# Patient Record
Sex: MALE | Race: WHITE | NOT HISPANIC OR LATINO | Employment: UNEMPLOYED | URBAN - METROPOLITAN AREA
[De-identification: names, ages, dates, MRNs, and addresses within clinical notes are randomized per-mention and may not be internally consistent; named-entity substitution may affect disease eponyms.]

---

## 2023-03-22 ENCOUNTER — TELEPHONE (OUTPATIENT)
Dept: PREADMISSION TESTING | Facility: HOSPITAL | Age: 48
End: 2023-03-22
Payer: COMMERCIAL

## 2023-03-22 NOTE — TELEPHONE ENCOUNTER
----- Message from Nenita Tom sent at 3/22/2023 12:32 PM CDT -----  Regarding: cOLONOSCOPY  Contact: [phone]  Patient is calling in ref to scheduling a colonoscopy. Please call patient at   [phone]     Thank You

## 2023-03-23 DIAGNOSIS — Z12.11 COLON CANCER SCREENING: Primary | ICD-10-CM

## 2023-04-14 ENCOUNTER — ANESTHESIA EVENT (OUTPATIENT)
Dept: ENDOSCOPY | Facility: HOSPITAL | Age: 48
End: 2023-04-14
Payer: COMMERCIAL

## 2023-04-14 ENCOUNTER — ANESTHESIA (OUTPATIENT)
Dept: ENDOSCOPY | Facility: HOSPITAL | Age: 48
End: 2023-04-14
Payer: COMMERCIAL

## 2023-04-14 ENCOUNTER — HOSPITAL ENCOUNTER (OUTPATIENT)
Facility: HOSPITAL | Age: 48
Discharge: HOME | End: 2023-04-14
Attending: COLON & RECTAL SURGERY | Admitting: COLON & RECTAL SURGERY
Payer: COMMERCIAL

## 2023-04-14 DIAGNOSIS — Z12.11 SCREENING FOR COLON CANCER: ICD-10-CM

## 2023-04-14 PROCEDURE — 37000008 HC ANESTHESIA 1ST 15 MINUTES: Performed by: COLON & RECTAL SURGERY

## 2023-04-14 PROCEDURE — 88305 TISSUE EXAM BY PATHOLOGIST: CPT | Mod: 26,,, | Performed by: PATHOLOGY

## 2023-04-14 PROCEDURE — 45380 COLONOSCOPY AND BIOPSY: CPT | Mod: 33,59,, | Performed by: COLON & RECTAL SURGERY

## 2023-04-14 PROCEDURE — 45385 PR COLONOSCOPY,REMV LESN,SNARE: ICD-10-PCS | Mod: 33,,, | Performed by: COLON & RECTAL SURGERY

## 2023-04-14 PROCEDURE — 37000009 HC ANESTHESIA EA ADD 15 MINS: Performed by: COLON & RECTAL SURGERY

## 2023-04-14 PROCEDURE — 88305 TISSUE EXAM BY PATHOLOGIST: CPT | Performed by: PATHOLOGY

## 2023-04-14 PROCEDURE — 25000003 PHARM REV CODE 250: Performed by: NURSE ANESTHETIST, CERTIFIED REGISTERED

## 2023-04-14 PROCEDURE — 45380 PR COLONOSCOPY,BIOPSY: ICD-10-PCS | Mod: 33,59,, | Performed by: COLON & RECTAL SURGERY

## 2023-04-14 PROCEDURE — 45385 COLONOSCOPY W/LESION REMOVAL: CPT | Mod: PT | Performed by: COLON & RECTAL SURGERY

## 2023-04-14 PROCEDURE — 88305 TISSUE EXAM BY PATHOLOGIST: ICD-10-PCS | Mod: 26,,, | Performed by: PATHOLOGY

## 2023-04-14 PROCEDURE — 25000003 PHARM REV CODE 250: Performed by: COLON & RECTAL SURGERY

## 2023-04-14 PROCEDURE — 45380 COLONOSCOPY AND BIOPSY: CPT | Mod: PT,59 | Performed by: COLON & RECTAL SURGERY

## 2023-04-14 PROCEDURE — 63600175 PHARM REV CODE 636 W HCPCS: Performed by: NURSE ANESTHETIST, CERTIFIED REGISTERED

## 2023-04-14 PROCEDURE — 27201012 HC FORCEPS, HOT/COLD, DISP: Performed by: COLON & RECTAL SURGERY

## 2023-04-14 PROCEDURE — 27201089 HC SNARE, DISP (ANY): Performed by: COLON & RECTAL SURGERY

## 2023-04-14 PROCEDURE — 45385 COLONOSCOPY W/LESION REMOVAL: CPT | Mod: 33,,, | Performed by: COLON & RECTAL SURGERY

## 2023-04-14 RX ORDER — DEXTROMETHORPHAN/PSEUDOEPHED 2.5-7.5/.8
DROPS ORAL
Status: DISCONTINUED | OUTPATIENT
Start: 2023-04-14 | End: 2023-04-14 | Stop reason: HOSPADM

## 2023-04-14 RX ORDER — LIDOCAINE HYDROCHLORIDE 10 MG/ML
INJECTION, SOLUTION EPIDURAL; INFILTRATION; INTRACAUDAL; PERINEURAL
Status: DISCONTINUED | OUTPATIENT
Start: 2023-04-14 | End: 2023-04-14

## 2023-04-14 RX ORDER — FLUTICASONE PROPIONATE 50 MCG
1 SPRAY, SUSPENSION (ML) NASAL DAILY
COMMUNITY

## 2023-04-14 RX ORDER — MONTELUKAST SODIUM 10 MG/1
10 TABLET ORAL NIGHTLY
COMMUNITY

## 2023-04-14 RX ORDER — PROPOFOL 10 MG/ML
VIAL (ML) INTRAVENOUS
Status: DISCONTINUED | OUTPATIENT
Start: 2023-04-14 | End: 2023-04-14

## 2023-04-14 RX ORDER — ALBUTEROL SULFATE 1.25 MG/3ML
1.25 SOLUTION RESPIRATORY (INHALATION) EVERY 6 HOURS PRN
COMMUNITY

## 2023-04-14 RX ADMIN — PROPOFOL 40 MG: 10 INJECTION, EMULSION INTRAVENOUS at 01:04

## 2023-04-14 RX ADMIN — PROPOFOL 30 MG: 10 INJECTION, EMULSION INTRAVENOUS at 01:04

## 2023-04-14 RX ADMIN — PROPOFOL 100 MG: 10 INJECTION, EMULSION INTRAVENOUS at 01:04

## 2023-04-14 RX ADMIN — LIDOCAINE HYDROCHLORIDE 50 MG: 10 INJECTION, SOLUTION EPIDURAL; INFILTRATION; INTRACAUDAL; PERINEURAL at 01:04

## 2023-04-14 RX ADMIN — SODIUM CHLORIDE, SODIUM LACTATE, POTASSIUM CHLORIDE, AND CALCIUM CHLORIDE: .6; .31; .03; .02 INJECTION, SOLUTION INTRAVENOUS at 01:04

## 2023-04-14 NOTE — PROVATION PATIENT INSTRUCTIONS
Discharge Summary/Instructions after an Endoscopic Procedure  Patient Name: Andrea Lazcano  Patient MRN: [id number]  Patient YOB: 1975 Friday, April 14, 2023 Clayton [place] MD  Dear patient,  As a result of recent federal legislation (The Federal Cures Act), you may   receive lab or pathology results from your procedure in your [facility]   account before your physician is able to contact you. Your physician or   their representative will relay the results to you with their   recommendations at their soonest availability.  Thank you,  RESTRICTIONS:  During your procedure today, you received medications for sedation.  These   medications may affect your judgment, balance and coordination.  Therefore,   for 24 hours, you have the following restrictions:   - DO NOT drive a car, operate machinery, make legal/financial decisions,   sign important papers or drink alcohol.    ACTIVITY:  Today: no heavy lifting, straining or running due to procedural   sedation/anesthesia.  The following day: return to full activity including work.  DIET:  Eat and drink normally unless instructed otherwise.     TREATMENT FOR COMMON SIDE EFFECTS:  - Mild abdominal pain, nausea, belching, bloating or excessive gas:  rest,   eat lightly and use a heating pad.  - Sore Throat: treat with throat lozenges and/or gargle with warm salt   water.  - Because air was used during the procedure, expelling large amounts of air   from your rectum or belching is normal.  - If a bowel prep was taken, you may not have a bowel movement for 1-3 days.    This is normal.  SYMPTOMS TO WATCH FOR AND REPORT TO YOUR PHYSICIAN:  1. Abdominal pain or bloating, other than gas cramps.  2. Chest pain.  3. Back pain.  4. Signs of infection such as: chills or fever occurring within 24 hours   after the procedure.  5. Rectal bleeding, which would show as bright red, maroon, or black stools.   (A tablespoon of blood from the rectum is not serious, especially  if   hemorrhoids are present.)  6. Vomiting.  7. Weakness or dizziness.  GO DIRECTLY TO THE NEAREST EMERGENCY ROOM IF YOU HAVE ANY OF THE FOLLOWING:      Difficulty breathing              Chills and/or fever over 101 F   Persistent vomiting and/or vomiting blood   Severe abdominal pain   Severe chest pain   Black, tarry stools   Bleeding- more than one tablespoon   Any other symptom or condition that you feel may need urgent attention  Your doctor recommends these additional instructions:  If any biopsies were taken, your doctors clinic will contact you in 1 to 2   weeks with any results.  - Discharge patient to home.   - High fiber diet.   - Continue present medications.   - Await pathology results.   - Repeat colonoscopy in 5 years for surveillance.   - Return to primary care physician PRN.  For questions, problems or results please call your physician Clayton [place] MD at Work:  [phone]  If you have any questions about the above instructions, call the GI   department at [phone] or call the endoscopy unit at [phone]   from 7am until 3 pm.  [facility], EMERGENCY ROOM PHONE NUMBER:   [phone]  IF A COMPLICATION OR EMERGENCY SITUATION ARISES AND YOU ARE UNABLE TO REACH   YOUR PHYSICIAN - GO DIRECTLY TO THE EMERGENCY ROOM.  I have read or have had read to me these discharge instructions for my   procedure and have received a written copy.  I understand these   instructions and will follow-up with my physician if I have any questions.     __________________________________       _____________________________________  Nurse Signature                                          Patient/Designated   Responsible Party Signature  MD Clayton Johnson MD  4/14/2023 2:04:42 PM  This report has been verified and signed electronically.  Dear patient,  As a result of recent federal legislation (The Federal Cures Act), you may   receive lab or pathology  results from your procedure in your [facility]   account before your physician is able to contact you. Your physician or   their representative will relay the results to you with their   recommendations at their soonest availability.  Thank you,  PROVATION

## 2023-04-14 NOTE — H&P
[facility] ([facility])  Colon and Rectal Surgery  History & Physical    Patient Name: Andrea Lazcano  MRN: [id number]  Admission Date: 4/14/2023  Attending Physician: Clayton [place] MD  Primary Care Provider: Primary Doctor No    Patient information was obtained from patient and medical records.    Subjective:     Chief Complaint/Reason for Admission: Here for Colonoscopy    History of Present Illness:  Patient is a 47 y.o. male presents for colonoscopy. Never had colonoscopy. No hematochezia, melena or change in bowel habits. +fam hx of rectal cancer in father in his 70s.    No current facility-administered medications on file prior to encounter.     No current outpatient medications on file prior to encounter.       Review of patient's allergies indicates:  Not on File    No past medical history on file.  No past surgical history on file.  Family History    None       Tobacco Use    Smoking status: Not on file    Smokeless tobacco: Not on file   Substance and Sexual Activity    Alcohol use: Not on file    Drug use: Not on file    Sexual activity: Not on file     Review of Systems   Constitutional:  Negative for activity change, appetite change, chills, fatigue, fever and unexpected weight change.   HENT:  Negative for congestion, ear pain, sore throat and trouble swallowing.    Eyes:  Negative for pain, redness and itching.   Respiratory:  Negative for cough, shortness of breath and wheezing.    Cardiovascular:  Negative for chest pain, palpitations and leg swelling.   Gastrointestinal:  Negative for abdominal distention, abdominal pain, anal bleeding, blood in stool, constipation, diarrhea, nausea, rectal pain and vomiting.   Endocrine: Negative for cold intolerance, heat intolerance and polyuria.   Genitourinary:  Negative for dysuria, flank pain, frequency and hematuria.   Musculoskeletal:  Negative for gait problem, joint swelling and neck pain.   Skin:  Negative for color change, rash and wound.  "  Allergic/Immunologic: Negative for environmental allergies and immunocompromised state.   Neurological:  Negative for dizziness, speech difficulty, weakness and numbness.   Psychiatric/Behavioral:  Negative for agitation, confusion and hallucinations.    Objective:     BP (!) 142/76 (BP Location: Left arm, Patient Position: Sitting)   Pulse 91   Temp 98.1 °F (36.7 °C) (Temporal)   Resp 18   Ht 5' 9" (1.753 m)   Wt 81.6 kg (180 lb)   SpO2 99%   BMI 26.58 kg/m²     Physical Exam  Constitutional:       Appearance: He is well-developed.   HENT:      Head: Normocephalic and atraumatic.   Eyes:      Conjunctiva/sclera: Conjunctivae normal.   Neck:      Thyroid: No thyromegaly.   Cardiovascular:      Rate and Rhythm: Normal rate and regular rhythm.   Pulmonary:      Effort: Pulmonary effort is normal. No respiratory distress.   Abdominal:      General: There is no distension.      Palpations: Abdomen is soft. There is no mass.      Tenderness: There is no abdominal tenderness.   Musculoskeletal:         General: No tenderness. Normal range of motion.      Cervical back: Normal range of motion.   Skin:     General: Skin is warm and dry.      Capillary Refill: Capillary refill takes less than 2 seconds.      Findings: No rash.   Neurological:      Mental Status: He is alert and oriented to person, place, and time.         Assessment/Plan:     Patient is a 47 y.o. male who presents for colonoscopy     - Ok to proceed to endoscopy suite for colonoscopy  - Consent obtained. All risks, benefits and alternatives fully explained to patient, including but not limited to bleeding, infection, perforation, and missed polyps. All questions appropriately answered to patient's satisfaction. Consent signed and placed on chart.    There are no hospital problems to display for this patient.    VTE Risk Mitigation (From admission, onward)      None            Clayton Walker MD  Colon and Rectal Surgery  [facility] " ([facility])

## 2023-04-14 NOTE — TRANSFER OF CARE
"Anesthesia Transfer of Care Note    Patient: Andrea Lazcano    Procedure(s) Performed: Procedure(s) (LRB):  COLONOSCOPY (N/A)    Patient location: PACU    Anesthesia Type: MAC    Transport from OR: Transported from OR on room air with adequate spontaneous ventilation    Post pain: adequate analgesia    Post assessment: no apparent anesthetic complications    Post vital signs: stable    Level of consciousness: responds to stimulation    Nausea/Vomiting: no nausea/vomiting    Complications: none    Transfer of care protocol was followed      Last vitals:   Visit Vitals  BP (!) 142/76 (BP Location: Left arm, Patient Position: Sitting)   Pulse 91   Temp 36.7 °C (98.1 °F) (Temporal)   Resp 18   Ht 5' 9" (1.753 m)   Wt 81.6 kg (180 lb)   SpO2 99%   BMI 26.58 kg/m²     "

## 2023-04-14 NOTE — LETTER
Andrea Lazcano  [address] [employer] [facility]  [place] [address]      [facility] ENDOSCOPY PATIENT INSTRUCTIONS    You are scheduled for a/an Colonoscopy (Procedure), on Friday (Day), 4/14/23 (Date).   Please check in for your appointment at 12:00 pm.                          Your procedure will be performed at [facility] ([facility]). The hospital is located at [address], off [address], [address] ([facility] [place]). Once on [address], [facility] is the second building on the left. Check in for your procedure on the 1st floor. ([phone])    ALL PATIENTS:   ? Please plan to be at the hospital for 3 - 4 hours.    ? Use of Anesthesia requires you to have a responsible person to drive you to the hospital, stay while the procedure is being performed, assume responsibility for your care at discharge, and drive you home. You should not operate a vehicle, machinery or sign any legal documents until the next day. YOU MUST HAVE A RESPONSIBLE PERSON TO DRIVE YOU HOME.  ? Leave all valuables at home, including jewelry. (Piercings need to be removed) You will need to bring your 's license, medical insurance card, and a method of payment. You will be responsible for any co-payment at time of registration - Please reach out to Financial Services if you have any questions or concerns.    ? If biopsies need to be performed or a polyp needs to be removed, this may result in a change in the billing of your procedure and could impact your payment responsibility depending on your insurance provider.   ? Wear clothing appropriate for easy re-dressing after sedation.   ? Please bring a complete list of all medications you are taking.         [facility] GOLYTELY, COLYTE, NULYTELY, GENERIC PEG 3350 W/ELECTROLYTES INSTRUCTIONS  Please use this page as a checklist for your preparation.      [] ITEMS TO PURCHASE BEFORE YOUR PROCEDURE:  Please purchase the following items from your local pharmacy prior to your  "appointment.    4 Dulcolax (bisacodyl) laxative tablets- no prescription needed, over the counter.   Gas X (simethicone) 125 mg capsules - no prescription needed, over the counter.    Dramamine (Meclizine) 25 mg tablet (for nausea)- no prescription needed, over the counter.   Prep- A prescription is required and has been sent to your pharmacy    [] FIVE DAYS BEFORE YOUR PROCEDURE: Begin low fiber diet- see attached instructions.    []THE DAY BEFORE YOUR PROCEDURE :(WHEN YOU WAKE)  Begin clear liquids only - no solid foods may be eaten until after your procedure has been performed/completed.  Please drink 1 - 2 gallons of clear liquids throughout the day.   You may consume the following items:    Coffee, water, or tea. (We agree it's odd, but coffee and tea without milk or creamer is considered a clear liquid)    Clear carbonated beverages (soft drinks), ginger ale, sprite, 7up, sparkling water, etc. No "Energy" beverages.    Gelatin dessert, (JELLO) plain or fruit flavored. No red or purple coloring/No solid pieces of fruit.   Apple juice, white grape juice, or white cranberry juice. No pulp, no orange juice.    Gatorade, Powerade, lemonade, or limeade. No red or purple.    Clear, fat-free, beef or chicken broths, or bouillon.    Snowballs, popsicles, slushes. No red or purple coloring, no pulp.    Avoid any liquids not listed above.     [] 12:00 PM (the day before procedure):   Take 4 Dulcolax (bisacodyl) tablets with a glass of clear liquid   Take 1 Gas X (simethicone) 125 mg capsule with a glass of clear liquid    [] 5:00 PM (the day before procedure), for prevention of nausea and vomiting:   Take ½ of a tablet (12.5 mg) of Meclizine every 6 hours as needed for nausea and/or vomiting. DO NOT TAKE MORE THAN 50 MG IN A 24 HOUR PERIOD.    [] 6:00 PM (the day before procedure) Begin the first portion of the Golytely or Nulytely prep. (Best if refrigerated)  1. Add cool clear liquid to mixing container " up to the fill line and mix.  2. Drink HALF of the mixture in the container.  3. You may refrigerate remaining half of the prep until ready to use the next morning.  Please have this consumed within 1 hour and 30 minutes. ---VERY IMPORTANT---  This entire process is required for the success of the examination.    Clear liquids may be continued until you finish the second portion of the prep. This will help you remain hydrated.    [] 5:00 AM (THE MORNING OF YOUR PROCEDURE): Complete the second portion of your prep.  1. Drink the second HALF of the mixture that was prepared the previous evening.  Please have this consumed within 1 hour and 30 minutes. ---VERY IMPORTANT---  This entire process is required for the success of the examination.      After you complete the bowel prep and the required water/clear liquid, you may not have anything else by mouth except for your medications, with a small sip of water. This includes no gum, mints, tobacco products.    Please follow these instructions to ensure you have a very good prep.  The goal is for stool to be CLEAR OR YELLOW liquid - NOT BROWN.  Avoid having to repeat the procedure due to a poor prep!    Please call [phone] or [phone] if you continue to have brown stool or have any questions about your prep instructions. Remember, due to Anesthesia, you MUST have a responsible person to drive you home!        LOW FIBER DIET  FIVE (5) DAYS BEFORE YOUR PROCEDURE- Please start a Low Fiber Diet as below:  A good preparation (clean out) of the colon is necessary prior to having a colonoscopy in order to ensure that all areas of the colon can be seen without difficulty. It is helpful to avoid high fiber foods prior to having a colonoscopy as high fiber foods (especially seeds and nuts) are more difficult to completely clear out of the colon. We recommend avoiding high fiber foods for at least 4 days prior to your colonoscopy. If you have issues with constipation you  may want to start avoiding high fiber foods 7 days before the procedure.    Foods to Include In Your Diet:       Grain Products:    Enriched refined white bread, buns, bagels, English muffins   Plain cereals e.g., Cheerios, Cornflakes, Cream of Wheat, Rice Krispies, Special K   Tea biscuits, arrowroot cookies, soda crackers, jerzy crackers, plain Mireya toast and flour tortillas  White rice, refined pasta and noodles   Fruits:   peel fruits when possible  Fruit juices except prune juice   Soft fruits: apricots, banana (1/2), cantaloupe, canned fruit cocktail, grapes,   honeydew melon, peaches, watermelon, citrus fruits, plums, pineapple   Sauces: Apple or apricot        Vegetables:    Vegetable juices    Tomato sauces    Potatoes (no skin)    Well-cooked and tender vegetables including alfalfa sprouts, spinach, beets, carrots, celery, cucumber, eggplant, lettuce, mushrooms, green/red peppers, squash, zucchini, onions, brussel sprouts, asparagus     Meat and Protein Choice:  Well-cooked, tender meat, fish and eggs            Foods to avoid:   ? Whole grain breads and pastas, corn bread or muffins, products made with whole grain products, bran, seeds, or nuts   ? Strong cheeses, yogurt containing fruit skins or seeds   ? Raw vegetables and pickles  ? All types of whole kernel corn   ? All beans, peas, and legumes   ? Fruit peels and hard fruits   ? Tough meat, meat with gristle   ? Crunchy peanut butter   ? Nuts, seeds and popcorn   ? Millet, buckwheat, flax, oatmeal, el seeds   ? Dried fruits, berries, other fruits with pulp or seeds (including blueberries, raspberries, and blackberries)   ? Food containing chocolate, coconut   ? Juices with pulp     Avoiding the recommended foods is one part of helping to ensure that you have adequate preparation and that you do not need to have a repeat colonoscopy any sooner than what would be recommended by the colon cancer screening guidelines.    Please follow these  instructions to ensure you have a very good prep.  The goal is for stool to be CLEAR OR YELLOW liquid - NOT BROWN.  Avoid having to repeat the procedure due to a poor prep!  Please call [phone] or [phone] if you continue to have brown stool or have any questions about your prep instructions.     Questions regarding scheduling: Endoscopy Scheduling [phone] (M-F, 7:30am-4:30pm)   Questions regarding prep or procedure: ([facility] Pre-Op Nurse [phone] or [phone]   Questions regarding prep or procedure: ([facility] Pre-Op Nurse: [phone]   Questions about Insurance/ Financial obligations: Financial Services [phone]   [facility] Endoscopy Unit [phone] (M-F, 6:30am-3:00pm)   [facility] [phone] (M-F, 6:30am-3:00pm)            Questions requiring immediate assistance: [facility] On-Call Nurse Line [phone] (After Hours)

## 2023-04-14 NOTE — ANESTHESIA PREPROCEDURE EVALUATION
04/14/2023  Andrea Lazcano is a 47 y.o., male.      Pre-op Assessment    I have reviewed the Patient Summary Reports.     I have reviewed the Nursing Notes. I have reviewed the NPO Status.   I have reviewed the Medications.     Review of Systems  Anesthesia Hx:  No problems with previous Anesthesia  Denies Family Hx of Anesthesia complications.   Denies Personal Hx of Anesthesia complications.   Social:  Non-Smoker    Hematology/Oncology:  Hematology Normal   Oncology Normal     EENT/Dental:EENT/Dental Normal   Cardiovascular:  Cardiovascular Normal Exercise tolerance: good     Pulmonary:   Asthma moderate Uses inhailer 2xday, probable CHRISTOPHER   Renal/:  Renal/ Normal     Hepatic/GI:  Hepatic/GI Normal    Musculoskeletal:  Musculoskeletal Normal    Neurological:  Neurology Normal    Endocrine:  Endocrine Normal    Dermatological:  Skin Normal    Psych:  Psychiatric Normal           Physical Exam  General: Well nourished, Cooperative, Alert and Oriented    Airway:  Mallampati: II   Mouth Opening: Normal  Neck ROM: Normal ROM    Dental:  Intact    Chest/Lungs:  Clear to auscultation, Normal Respiratory Rate    Heart:  Rate: Normal  Rhythm: Regular Rhythm        Anesthesia Plan  Type of Anesthesia, risks & benefits discussed:    Anesthesia Type: MAC  Intra-op Monitoring Plan: Standard ASA Monitors  Induction:  IV  Informed Consent: Informed consent signed with the Patient and all parties understand the risks and agree with anesthesia plan.  All questions answered.   ASA Score: 2  Day of Surgery Review of History & Physical: H&P Update referred to the surgeon/provider.I have interviewed and examined the patient. I have reviewed the patient's H&P dated: There are no significant changes.     Ready For Surgery From Anesthesia Perspective.     .

## 2023-04-14 NOTE — PLAN OF CARE
Pt AAO, ambulatory. Discharge instructions given to pt/wife, verbalized understanding.  Pt seen by MD at bedside.  Discharged to family.

## 2023-04-14 NOTE — ANESTHESIA POSTPROCEDURE EVALUATION
Anesthesia Post Evaluation    Patient: Andrea Lazcano    Procedure(s) Performed: Procedure(s) (LRB):  COLONOSCOPY (N/A)    Final Anesthesia Type: MAC      Patient location during evaluation: PACU  Patient participation: Yes- Able to Participate  Level of consciousness: awake and alert  Post-procedure vital signs: reviewed and stable  Pain management: adequate  Airway patency: patent    PONV status at discharge: No PONV  Anesthetic complications: no      Cardiovascular status: blood pressure returned to baseline  Respiratory status: unassisted and room air  Hydration status: euvolemic  Follow-up not needed.          Vitals Value Taken Time   /76 04/14/23 1218   Temp 36.7 °C (98.1 °F) 04/14/23 1218   Pulse 91 04/14/23 1218   Resp 18 04/14/23 1218   SpO2 99 % 04/14/23 1218         No case tracking events are documented in the log.      Pain/Shaina Score: No data recorded

## 2023-04-14 NOTE — BRIEF OP NOTE
[facility] (Hospital)  Brief Operative Note     SUMMARY     Surgery Date: 4/14/2023     Surgeon(s) and Role:     * Taiwo [place] MD - Primary    Assisting Surgeon: None    Pre-op Diagnosis:  Colon cancer screening [Z12.11]    Post-op Diagnosis:  Post-Op Diagnosis Codes:     * Colon cancer screening [Z12.11]    Procedure(s) (LRB):  COLONOSCOPY (N/A)    Anesthesia: Choice    Description of the findings of the procedure: two polyps removed    Estimated Blood Loss: * No values recorded between 4/14/2023  1:37 PM and 4/14/2023  2:04 PM *         Specimens:   Specimen (24h ago, onward)       Start     Ordered    04/14/23 1353  Specimen to Pathology, Surgery Gastrointestinal tract  Once        Comments: Pre-op Diagnosis: Colon cancer screening [Z12.11]Procedure(s):COLONOSCOPY 1.  Hepatic Flexure colon polypectomy x 2     References:    Click here for ordering Quick Tip   Question Answer Comment   Procedure Type: Gastrointestinal tract    Which provider would you like to cc? TAIWO WALKER    Release to patient Immediate        04/14/23 1402                    Discharge Note    SUMMARY     Admit Date: 4/14/2023    Discharge Date and Time: 4/14/2023 2:06 PM    Hospital Course Patient was seen in the preoperative area by both myself and anesthesia. All consents were verified and all questions appropriately answered. All risks, benefits and alternatives explained to patient. Patient proceeded to endoscopy suite for colonoscopy and was discharged home postoperative once cleared by anesthesia.    Final Diagnosis: Post-Op Diagnosis Codes:     * Colon cancer screening [Z12.11]    Disposition: Home or Self Care    Follow Up/Patient Instructions: See Provation report    Medications:  Reconciled Home Medications:      Medication List        CONTINUE taking these medications      albuterol 1.25 mg/3 mL Nebu  Commonly known as: ACCUNEB  Take 1.25 mg by nebulization every 6 (six) hours as needed. Rescue     fluticasone  propionate 50 mcg/actuation nasal spray  Commonly known as: FLONASE  1 spray by Each Nostril route once daily.     montelukast 10 mg tablet  Commonly known as: SINGULAIR  Take 10 mg by mouth every evening.            Discharge Procedure Orders   Diet general     Call MD for:  temperature >100.4     Call MD for:  persistent nausea and vomiting     Call MD for:  severe uncontrolled pain     Call MD for:  difficulty breathing, headache or visual disturbances     Call MD for:  redness, tenderness, or signs of infection (pain, swelling, redness, odor or green/yellow discharge around incision site)     Call MD for:  hives     Call MD for:  persistent dizziness or light-headedness     Call MD for:  extreme fatigue     Activity as tolerated      Follow-up Information       Primary Doctor No Follow up.    Why: As needed

## 2023-04-18 VITALS
HEART RATE: 65 BPM | OXYGEN SATURATION: 95 % | TEMPERATURE: 98 F | HEIGHT: 69 IN | WEIGHT: 180 LBS | SYSTOLIC BLOOD PRESSURE: 119 MMHG | DIASTOLIC BLOOD PRESSURE: 76 MMHG | RESPIRATION RATE: 16 BRPM | BODY MASS INDEX: 26.66 KG/M2

## 2023-04-24 LAB
FINAL PATHOLOGIC DIAGNOSIS: NORMAL
Lab: NORMAL

## 2023-04-28 ENCOUNTER — TELEPHONE (OUTPATIENT)
Dept: SURGERY | Facility: CLINIC | Age: 48
End: 2023-04-28
Payer: COMMERCIAL

## 2023-04-28 NOTE — TELEPHONE ENCOUNTER
Spoke with patient regarding recent colonoscopy results. Informed patient that all lesions were removed and completely benign. Recommended to keep colonoscopy surveillance for 5 years. Patient verbalized understanding with no further questions at this time.     ----- Message from Clayton Walker MD sent at 4/27/2023  2:46 PM CDT -----  Please call patient and let him know that all lesions removed on recent colonoscopy were benign. Nothing further needs to be done at this time as they were completely removed. Recommend keeping repeat surveillance colonoscopy in 5 years as previously discussed.